# Patient Record
Sex: MALE | Race: WHITE | NOT HISPANIC OR LATINO | Employment: FULL TIME | URBAN - METROPOLITAN AREA
[De-identification: names, ages, dates, MRNs, and addresses within clinical notes are randomized per-mention and may not be internally consistent; named-entity substitution may affect disease eponyms.]

---

## 2017-10-06 ENCOUNTER — HOSPITAL ENCOUNTER (EMERGENCY)
Facility: CLINIC | Age: 32
Discharge: HOME | End: 2017-10-06
Attending: FAMILY MEDICINE | Admitting: FAMILY MEDICINE

## 2017-10-06 VITALS
TEMPERATURE: 98.2 F | HEART RATE: 92 BPM | BODY MASS INDEX: 24.41 KG/M2 | OXYGEN SATURATION: 99 % | RESPIRATION RATE: 16 BRPM | DIASTOLIC BLOOD PRESSURE: 81 MMHG | WEIGHT: 175 LBS | SYSTOLIC BLOOD PRESSURE: 137 MMHG
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
## 2017-10-06 DIAGNOSIS — Z23 NEED FOR VACCINATION: ICD-10-CM

## 2017-10-06 DIAGNOSIS — S61.412A LACERATION OF LEFT HAND WITHOUT FOREIGN BODY, INITIAL ENCOUNTER: ICD-10-CM

## 2017-10-06 PROCEDURE — 25000128 H RX IP 250 OP 636: Performed by: FAMILY MEDICINE

## 2017-10-06 PROCEDURE — 12041 INTMD RPR N-HF/GENIT 2.5CM/<: CPT

## 2017-10-06 PROCEDURE — 25000125 ZZHC RX 250: Performed by: FAMILY MEDICINE

## 2017-10-06 PROCEDURE — 99282 EMERGENCY DEPT VISIT SF MDM: CPT | Mod: 25 | Performed by: FAMILY MEDICINE

## 2017-10-06 PROCEDURE — 99283 EMERGENCY DEPT VISIT LOW MDM: CPT | Mod: 25 | Performed by: FAMILY MEDICINE

## 2017-10-06 PROCEDURE — 90471 IMMUNIZATION ADMIN: CPT | Performed by: FAMILY MEDICINE

## 2017-10-06 PROCEDURE — 12031 INTMD RPR S/A/T/EXT 2.5 CM/<: CPT | Mod: Z6 | Performed by: FAMILY MEDICINE

## 2017-10-06 PROCEDURE — 90471 IMMUNIZATION ADMIN: CPT

## 2017-10-06 PROCEDURE — 12041 INTMD RPR N-HF/GENIT 2.5CM/<: CPT | Performed by: FAMILY MEDICINE

## 2017-10-06 PROCEDURE — 90715 TDAP VACCINE 7 YRS/> IM: CPT | Performed by: FAMILY MEDICINE

## 2017-10-06 RX ORDER — LIDOCAINE HYDROCHLORIDE AND EPINEPHRINE 10; 10 MG/ML; UG/ML
2 INJECTION, SOLUTION INFILTRATION; PERINEURAL ONCE
Status: COMPLETED | OUTPATIENT
Start: 2017-10-06 | End: 2017-10-06

## 2017-10-06 RX ADMIN — LIDOCAINE HYDROCHLORIDE,EPINEPHRINE BITARTRATE 20 ML: 10; .01 INJECTION, SOLUTION INFILTRATION; PERINEURAL at 22:32

## 2017-10-06 RX ADMIN — CLOSTRIDIUM TETANI TOXOID ANTIGEN (FORMALDEHYDE INACTIVATED), CORYNEBACTERIUM DIPHTHERIAE TOXOID ANTIGEN (FORMALDEHYDE INACTIVATED), BORDETELLA PERTUSSIS TOXOID ANTIGEN (GLUTARALDEHYDE INACTIVATED), BORDETELLA PERTUSSIS FILAMENTOUS HEMAGGLUTININ ANTIGEN (FORMALDEHYDE INACTIVATED), BORDETELLA PERTUSSIS PERTACTIN ANTIGEN, AND BORDETELLA PERTUSSIS FIMBRIAE 2/3 ANTIGEN 0.5 ML: 5; 2; 2.5; 5; 3; 5 INJECTION, SUSPENSION INTRAMUSCULAR at 22:09

## 2017-10-06 ASSESSMENT — ENCOUNTER SYMPTOMS: WOUND: 1

## 2017-10-06 NOTE — ED AVS SNAPSHOT
[facility] Emergency Department    [address]     [place] [address]    Phone:  [phone]    Fax:  [phone]                                       Jose Manuel Avalos   MRN: [id number]    Department:  [facility] Emergency Department   Date of Visit:  10/6/2017           Patient Information     Date Of Birth          1985        Your diagnoses for this visit were:     Laceration of left hand without foreign body, initial encounter 2cm flap       You were seen by Valdemar Palacios DO.      Follow-up Information     Follow up with [facility] Emergency Department.    Specialty:  EMERGENCY MEDICINE    Why:  If symptoms worsen    Contact information:    [address] [facility]   [place] [address] [phone]    Additional information:    From [address]: Exit at [address] on south side of [place]. Turn right on [address]. Turn left at stoplight on [address]. [facility] will be in view two blocks ahead        Discharge Instructions       Please read and follow the handout(s) instructions. Return, if needed, for increased or worsening symptoms and as directed by the handout(s).    See the note for work.    Remove the stitches in about 10 days.    Electronically signed, Valdemar Palacios DO      Discharge References/Attachments     LACERATION, HAND: ALL CLOSURES (ENGLISH)      24 Hour Appointment Hotline       To make an appointment at any [facility] clinic, call [phone] ([phone]). If you don't have a family doctor or clinic, we will help you find one. [facility] clinics are conveniently located to serve the needs of you and your family.             Review of your medicines      Notice     You have not been prescribed any medications.            Procedures and tests performed during your visit     Laceration repair      Orders Needing Specimen Collection     None      Pending Results     No orders found from 10/4/2017 to 10/7/2017.     "        Pending Culture Results     No orders found from 10/4/2017 to 10/7/2017.            Pending Results Instructions     If you had any lab results that were not finalized at the time of your Discharge, you can call the ED Lab Result RN at [phone]. You will be contacted by this team for any positive Lab results or changes in treatment. The nurses are available 7 days a week from 10A to 6:30P.  You can leave a message 24 hours per day and they will return your call.        Thank you for choosing [facility]       Thank you for choosing [facility] for your care. Our goal is always to provide you with excellent care. Hearing back from our patients is one way we can continue to improve our services. Please take a few minutes to complete the written survey that you may receive in the mail after you visit with us. Thank you!        [employer] Information     [employer] lets you send messages to your doctor, view your test results, renew your prescriptions, schedule appointments and more. To sign up, go to [email or web address] . Click on \"Log in\" on the left side of the screen, which will take you to the Welcome page. Then click on \"Sign up Now\" on the right side of the page.     You will be asked to enter the access code listed below, as well as some personal information. Please follow the directions to create your username and password.     Your access code is: [email or web address]  Expires: 2018 10:41 PM     Your access code will  in 90 days. If you need help or a new code, please call your [facility] clinic or [phone].        Care EveryWhere ID     This is your Care EveryWhere ID. This could be used by other organizations to access your [facility] medical records  [id number]        Equal Access to Services     MINESH GUTIÉRREZ : kody John, sebastian flores. So [facility] [phone].    ATENCIÓN: Si joão sterling " disposición servicios gratuitos de asistencia lingüística. [place] [phone].    We comply with applicable federal civil rights laws and [place] laws. We do not discriminate on the basis of race, color, national origin, age, disability, sex, sexual orientation, or gender identity.            After Visit Summary       This is your record. Keep this with you and show to your community pharmacist(s) and doctor(s) at your next visit.

## 2017-10-06 NOTE — LETTER
1985      To Whom it may concern:    Jose Manuel Avalos was seen in our Emergency Department today, 10/06/17 for an injury that was reported to be work related.      The injury occurred on 10/6/2017  .  Diagnosis:   1. Laceration of left hand without foreign body, initial encounter        After returning the following restrictions apply for the next 10 days:    The employee must keep the injured site clean and dry.    Follow up: As needed for sign of infection.    Sincerely,    Valdemar Palacios  Physician  [facility] Emergency Room

## 2017-10-06 NOTE — ED AVS SNAPSHOT
[facility] Emergency Department    [address] [place] [address]    Phone:  [phone]    Fax:  [phone]                                       Jose Manuel Avalos   MRN: [id number]    Department:  [facility] Emergency Department   Date of Visit:  10/6/2017           After Visit Summary Signature Page     I have received my discharge instructions, and my questions have been answered. I have discussed any challenges I see with this plan with the nurse or doctor.    ..........................................................................................................................................  Patient/Patient Representative Signature      ..........................................................................................................................................  Patient Representative Print Name and Relationship to Patient    ..................................................               ................................................  Date                                            Time    ..........................................................................................................................................  Reviewed by Signature/Title    ...................................................              ..............................................  Date                                                            Time

## 2017-10-07 NOTE — DISCHARGE INSTRUCTIONS
Please read and follow the handout(s) instructions. Return, if needed, for increased or worsening symptoms and as directed by the handout(s).    See the note for work.    Remove the stitches in about 10 days.    Electronically signed, Valdemar Palacios DO

## 2017-10-07 NOTE — ED PROVIDER NOTES
History     Chief Complaint   Patient presents with     Laceration     HPI  Jose Manuel Avalos is a 32 year old male who presents to the ER with concerns about a laceration to the left hand. He states the injury occurred at work while he was taking down a sign cutting the palm of his hand on a bolt edge. The injury occurred about 13:00hrs today. He states he cleaned the hand well after and put some antibiotic ointment on it. No additional bleeding but wonders if it needs repair. He thinks his last tetanus was about 10 years ago.    I reviewed the following nurse triage note:     Status: Signed : Marizol Ryan, RN (Registered Nurse)        Pt presents with a laceration to the palm of his left hand. He reports that he was taking down a sign at work and was cut by a bolt on the back of it.  Bleeding is controlled, last Td was 10 years ago                  I have reviewed the Medications, Allergies, Past Medical and Surgical History, and Social History in the Epic system.      History reviewed. No pertinent past medical history.     Past Surgical History:   Procedure Laterality Date     DENTAL SURGERY         No family history on file.    Social History     Social History     Marital status: Single     Spouse name: N/A     Number of children: N/A     Years of education: N/A     Occupational History     Not on file.     Social History Main Topics     Smoking status: Current Every Day Smoker     Packs/day: 0.50     Smokeless tobacco: Never Used     Alcohol use No     Drug use: No     Sexual activity: Not on file     Other Topics Concern     Not on file     Social History Narrative       No current outpatient prescriptions on file.       No Known Allergies      There is no immunization history on file for this patient.    [facility] [place] Immunization Information Connection   0 RECORD(S) FOUND          Review of Systems   Skin: Positive for wound (left hand).   All other systems reviewed and are  negative.      Physical Exam   BP: 137/81  Pulse: 92  Temp: 98.2  F (36.8  C)  Resp: 16  Weight: 79.4 kg (175 lb)  SpO2: 99 %  Physical Exam   Constitutional: He appears well-developed and well-nourished. No distress.   Musculoskeletal:        Left hand: He exhibits tenderness and laceration. He exhibits normal range of motion and normal two-point discrimination. Normal sensation noted. Normal strength noted.        Hands:  Nursing note and vitals reviewed.          ED Course     ED Course     Laceration repair  Date/Time: 10/6/2017 10:35 PM  Performed by: AMNA ENRIQUEZ  Authorized by: AMNA ENRIQUEZ   Consent: Verbal consent obtained.  Risks and benefits: risks, benefits and alternatives were discussed  Consent given by: patient  Patient identity confirmed: verbally with patient  Body area: upper extremity  Location details: left hand  Laceration length: 2 (flap) cm  Tendon involvement: none  Nerve involvement: none  Anesthesia: local infiltration    Anesthesia:  Local Anesthetic: lidocaine 1% with epinephrine  Anesthetic total: 2 mL    Sedation:  Patient sedated: no  Preparation: Patient was prepped and draped in the usual sterile fashion.  Irrigation solution: tap water  Irrigation method: tap and syringe  Amount of cleaning: extensive  Debridement: none  Degree of undermining: none  Skin closure: 4-0 nylon  Number of sutures: 5  Technique: simple  Approximation: close  Approximation difficulty: simple  Patient tolerance: Patient tolerated the procedure well with no immediate complications                   Critical Care time:  none            Medications ordered to be administered in the ER:    Medications   Tdap (tetanus-diphtheria-acell pertussis) (ADACEL) injection 0.5 mL (0.5 mLs Intramuscular Given 10/6/17 2209)   lidocaine 1% with EPINEPHrine 1:100,000 injection 2 mL (20 mLs Intradermal Given 10/6/17 2232)     Assessments & Plan (with Medical Decision Making)  32-year-old male to the ER  with concerns of a laceration to the palm of the left hand.  Patient was not up-to-date on his tetanus immunization and this was given to him.  Flap laceration noted to the palm of the left hand without tendon or nerve involvement.  The area was cleansed and irrigated and repaired as noted above.  Patient was instructed in appropriate care of wound and a work note was filled out for him to give to his employer.  He is instructed to have the stitches removed in approximately 10 days.       I have reviewed the nursing notes.    I have reviewed the findings, diagnosis, plan and need for follow up with the patient.           I verbally discussed the findings of the evaluation today in the ER. I have verbally discussed with Jose Manuel the suggested treatment(s) as described in the discharge instructions and handouts. I have prescribed the above listed medications and instructed him on appropriate use of these medications.      I have verbally suggested he follow-up in his clinic or return to the ER for increased symptoms. See the follow-up recommendations documented  in the after visit summary in this visit's EPIC chart.      Final diagnoses:   Laceration of left hand without foreign body, initial encounter - 2cm flap       10/6/2017   [facility] EMERGENCY DEPARTMENT     Valdemar Palacios,   10/06/17 0229

## 2017-10-07 NOTE — ED NOTES
Pt presents with a laceration to the palm of his left hand. He reports that he was taking down a sign at work and was cut by a bolt on the back of it.  Bleeding is controlled, last Td was 10 years ago

## 2019-10-03 NOTE — PROGRESS NOTES
Subjective     Jose Manuel Avalos is a 34 year old male who presents to clinic today for the following health issues:    HPI   Concern - Neck irritation   Onset: 2-3 months     Description:   Shoulder to shoulder across the back of the neck is a burning, itching, tingling without rash.  Thought originally it was due to detergent but that is not the case.  The more active he is the less he notices it.    Therapies Tried and outcome: none    Patient states symptoms started about 2-6 mos ago he was having posterior neck sensation of prickly needles, hot, stabbing pain that comes and goes in severity. He thought it was originally from a bug bite but that was cleared a long time ago.     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 He is often lifting overhead often and looking upwards.   Also recently noted a small nodule mildly tender posterior neck on right 1 cm firm mobile.       There is no problem list on file for this patient.    Past Surgical History:   Procedure Laterality Date     DENTAL SURGERY         Social History     Tobacco Use     Smoking status: Current Every Day Smoker     Packs/day: 1.00     Smokeless tobacco: Never Used   Substance Use Topics     Alcohol use: Yes     History reviewed. No pertinent family history.      No current outpatient medications on file.     No Known Allergies  Recent Labs   Lab Test 06/26/13  0345 06/26/13  0050   ALT  --  20   CR 1.56* 1.92*   GFRESTIMATED 55* 43*   GFRESTBLACK 66 52*   POTASSIUM 4.5 4.3      BP Readings from Last 3 Encounters:   10/08/19 122/70   10/06/17 137/81   06/26/13 102/56    Wt Readings from Last 3 Encounters:   10/08/19 86.6 kg (191 lb)   10/06/17 79.4 kg (175 lb)   06/26/13 73.9 kg (163 lb)        Reviewed and updated as needed this visit by Provider    Review of Systems   ROS COMP: Constitutional, HEENT, cardiovascular, pulmonary, GI, , musculoskeletal, neuro, skin, endocrine  and psych systems are negative, except as otherwise noted.      Objective    /70   Pulse 80   Temp 98.8  F (37.1  C) (Temporal)   Resp 16   Wt 86.6 kg (191 lb)   SpO2 98%   BMI 26.64 kg/m    Body mass index is 26.64 kg/m .  Physical Exam   GENERAL: healthy, alert and no distress  EYES: Eyes grossly normal to inspection, PERRL and conjunctivae and sclerae normal  HENT: ear canals and TM's normal, nose and mouth without ulcers or lesions  NECK: no adenopathy, no asymmetry, masses, or scars and thyroid normal to palpation  RESP: lungs clear to auscultation - no rales, rhonchi or wheezes  CV: regular rate and rhythm, normal S1 S2, no S3 or S4, no murmur, click or rub, no peripheral edema and peripheral pulses strong  MS: Cervical Spine Exam: Inspection: normal cervical lordosis, no scapular winging  Tender:  None  Range of Motion:  Full ROM of cervical spine, flexion:  painful, left lateral bending: painful, right lateral bending: painful  Strength: Full strength of all neck muscles  Special tests:  Spurling's - negative - left, Spurling's - negative - right  SKIN: small nodule posterior neck as noted above      Assessment & Plan     1. Cervical radiculopathy at C5    - XR Cervical Spine 2/3 Views; Future  - PHYSICAL THERAPY REFERRAL; Future    2. Nodule of skin of neck    - US Head Neck Soft Tissue; Future     Concern for cervicalgia due to history of work and amount of overhead lifting and looking up repetitive. Will have us of nodule possible lymph vs seb cyst.   Restriction letter given for work and PT to see if this helps symptoms if not would recommend MRI     Tobacco Cessation:   reports that he has been smoking. He has been smoking about 1.00 pack per day. He has never used smokeless tobacco.  Tobacco Cessation Action Plan: Information offered: Patient not interested at this time        Patient Instructions   Recommend avoiding overhead lifting, prolonged flexion of neck in upwards motion (looking  up). No heavy pushing or pulling. May lift with legs.   I will call you with radiology read of xray and ultra sound.     Recommend physical therapy for further treatment plan for cervical nerve path symptoms     Return to clinic in 6 weeks if symptoms not improving            DAKSHA Laird [facility]

## 2019-10-08 ENCOUNTER — ANCILLARY PROCEDURE (OUTPATIENT)
Dept: GENERAL RADIOLOGY | Facility: OTHER | Age: 34
End: 2019-10-08
Attending: NURSE PRACTITIONER
Payer: COMMERCIAL

## 2019-10-08 ENCOUNTER — OFFICE VISIT (OUTPATIENT)
Dept: FAMILY MEDICINE | Facility: OTHER | Age: 34
End: 2019-10-08
Payer: COMMERCIAL

## 2019-10-08 VITALS
WEIGHT: 191 LBS | TEMPERATURE: 98.8 F | OXYGEN SATURATION: 98 % | DIASTOLIC BLOOD PRESSURE: 70 MMHG | RESPIRATION RATE: 16 BRPM | HEART RATE: 80 BPM | BODY MASS INDEX: 26.64 KG/M2 | SYSTOLIC BLOOD PRESSURE: 122 MMHG

## 2019-10-08 DIAGNOSIS — M54.12 CERVICAL RADICULOPATHY AT C5: Primary | ICD-10-CM

## 2019-10-08 DIAGNOSIS — R22.1 NODULE OF SKIN OF NECK: ICD-10-CM

## 2019-10-08 DIAGNOSIS — M54.12 CERVICAL RADICULOPATHY AT C5: ICD-10-CM

## 2019-10-08 PROCEDURE — 72040 X-RAY EXAM NECK SPINE 2-3 VW: CPT | Mod: FY

## 2019-10-08 PROCEDURE — 99203 OFFICE O/P NEW LOW 30 MIN: CPT | Performed by: NURSE PRACTITIONER

## 2019-10-08 NOTE — PATIENT INSTRUCTIONS
Recommend avoiding overhead lifting, prolonged flexion of neck in upwards motion (looking up). No heavy pushing or pulling. May lift with legs.   I will call you with radiology read of xray and ultra sound.     Recommend physical therapy for further treatment plan for cervical nerve path symptoms     Return to clinic in 6 weeks if symptoms not improving

## 2019-10-08 NOTE — LETTER
[facility]  [address]  [place] [address]  Phone: [phone]    October 8, 2019        Jose Manuel Avalos  [address]  [facility] [address]          To whom it may concern:    RE: Jose Manuel Avalos    Patient may return to work with the following: Recommend avoiding overhead lifting, prolonged flexion of neck in upwards motion (looking up). No heavy pushing or pulling. May lift with legs. Will be seeing physical therapy for treatment of cervical spine radicular symptoms.     Return to clinic in 6 weeks for follow up symptoms.     Please contact me for questions or concerns.      Sincerely,        DAKSHA Laird CNP

## 2019-10-09 ENCOUNTER — TELEPHONE (OUTPATIENT)
Dept: FAMILY MEDICINE | Facility: OTHER | Age: 34
End: 2019-10-09

## 2019-10-09 DIAGNOSIS — M54.12 CERVICAL RADICULOPATHY AT C5: Primary | ICD-10-CM

## 2019-10-09 NOTE — TELEPHONE ENCOUNTER
Emmanuelle Tsai RT (R) contacted Jose Manuel on 10/09/19 and left a message. If patient calls back please inform patient of results below, thanks     Please advise Jose Manuel Avalos,  1985, that his xray results were abnormal cervical curvature at area of symptoms recommend he have MRI completed per radiology read. Please let me know if he would like order placed for this.   [phone] (home)   Thank you   Amy Mills CNP

## 2019-10-09 NOTE — RESULT ENCOUNTER NOTE
Please advise Jose Manuel Avalos,  1985, that his xray results were abnormal cervical curvature at area of symptoms recommend he have MRI completed per radiology read. Please let me know if he would like order placed for this.   [phone] (home)   Thank you  Amy Mills CNP

## 2019-10-10 NOTE — TELEPHONE ENCOUNTER
Patient calling back, results were relayed. Patient states he would like to go ahead and get the MRI. Patient would like MRI done in [place]. Please advise.

## 2019-10-11 NOTE — TELEPHONE ENCOUNTER
Spoke with patient informed him of message below phone number to [facility] was given to patient to schedule   Closing encounter  Emmanuelle Tsai RT (R)

## 2019-10-15 ENCOUNTER — HOSPITAL ENCOUNTER (OUTPATIENT)
Dept: ULTRASOUND IMAGING | Facility: CLINIC | Age: 34
Discharge: HOME | End: 2019-10-15
Attending: NURSE PRACTITIONER | Admitting: NURSE PRACTITIONER
Payer: COMMERCIAL

## 2019-10-15 ENCOUNTER — HOSPITAL ENCOUNTER (OUTPATIENT)
Dept: MRI IMAGING | Facility: CLINIC | Age: 34
End: 2019-10-15
Attending: NURSE PRACTITIONER
Payer: COMMERCIAL

## 2019-10-15 ENCOUNTER — HOSPITAL ENCOUNTER (EMERGENCY)
Facility: CLINIC | Age: 34
End: 2019-10-15
Payer: COMMERCIAL

## 2019-10-15 DIAGNOSIS — M54.12 CERVICAL RADICULOPATHY AT C5: ICD-10-CM

## 2019-10-15 DIAGNOSIS — R22.1 NODULE OF SKIN OF NECK: ICD-10-CM

## 2019-10-15 PROCEDURE — 72141 MRI NECK SPINE W/O DYE: CPT

## 2019-10-15 PROCEDURE — 76536 US EXAM OF HEAD AND NECK: CPT

## 2019-10-15 NOTE — RESULT ENCOUNTER NOTE
Please advise Jose Manuel Avalos,  1985, that his us results were benign appearing lymph node  [phone] (home)   Thank you  Amy Mills CNP

## 2019-10-16 DIAGNOSIS — M54.12 CERVICAL RADICULOPATHY AT C5: Primary | ICD-10-CM

## 2019-10-16 DIAGNOSIS — R93.7 ABNORMAL CT SCAN, CERVICAL SPINE: ICD-10-CM

## 2019-10-21 ENCOUNTER — HOSPITAL ENCOUNTER (OUTPATIENT)
Dept: PHYSICAL THERAPY | Facility: OTHER | Age: 34
Setting detail: THERAPIES SERIES
End: 2019-10-21
Attending: NURSE PRACTITIONER
Payer: COMMERCIAL

## 2019-10-21 DIAGNOSIS — M54.12 CERVICAL RADICULOPATHY AT C5: ICD-10-CM

## 2019-10-21 PROCEDURE — 97110 THERAPEUTIC EXERCISES: CPT | Mod: GP | Performed by: PHYSICAL THERAPIST

## 2019-10-21 PROCEDURE — 97161 PT EVAL LOW COMPLEX 20 MIN: CPT | Mod: GP | Performed by: PHYSICAL THERAPIST

## 2019-10-21 PROCEDURE — 97140 MANUAL THERAPY 1/> REGIONS: CPT | Mod: GP | Performed by: PHYSICAL THERAPIST

## 2019-10-21 NOTE — PROGRESS NOTES
10/21/19 1300   General Information   Type of Visit Initial OP Ortho PT Evaluation   Start of Care Date 10/21/19   Referring Physician Amy Boudreaux CNP   Patient/Family Goals Statement To be able to work with no restrictions and no pain   Orders Evaluate and Treat   Date of Order 10/08/19   Certification Required? No   Medical Diagnosis Cervical raidiculopathy at C5   Surgical/Medical history reviewed Yes   Precautions/Limitations no known precautions/limitations   Body Part(s)   Body Part(s) Cervical Spine   Presentation and Etiology   Pertinent history of current problem (include personal factors and/or comorbidities that impact the POC) Patient symptoms started in the Spring, came on over time. Pain is on the left, no pain on the right. Pain today is a #3. 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 Been working still with the restrictions no lifting heavy, no looking up for periods of time, use legs mostly. Not lifting as heavy things. Pain is worse with moving around and working. The pain sometimes causes him to feel like he needs to look down and the head is a little too heavy. PMHX: none, and no HX of neck pain. Looking up alot to install the signs. Looking up can be from 30 min to 2 hours. Patient is right handed. 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    Impairments A. Pain;C. Swelling;E. Decreased flexibility;L. Tingling   Functional Limitations perform activities of daily living;perform required work activities;perform desired leisure / sports activities   Symptom Location left side of the neck and the upper shoulders   How/Where did it occur From insidious onset   Onset date of current episode/exacerbation 05/21/19   Chronicity New   Pain/symptoms exacerbated by C. Lifting;D. Carrying;G. Certain positions;H. Overhead reach;J. ADL;K. Home tasks;L. Work tasks   Pain/symptoms eased by C. Rest    Current / Previous Interventions   Diagnostic Tests:   (see MRI and xray)   Prior Level of Function   Prior Level of Function-Mobility independent   Current Level of Function   Patient role/employment history A. Employed   [facility]   Fall Risk Screen   Fall screen completed by PT   Have you fallen 2 or more times in the past year? No   Have you fallen and had an injury in the past year? No   Is patient a fall risk? No   Cervical Spine   Observation forward head, somewhat straight spine   Posture forward head   Cervical Flexion ROM WNL   Cervical Extension ROM WNL   Cervical Right Side Bending ROM WNL   Cervical Left Side Bending ROM WNL   Cervical Right Rotation ROM WNL   Cervical Left Rotation ROM WNL   Shoulder AROM Screen WNL   Cervical/Thoracic/Shoulder ROM Comments neck retractions 50%   Shoulder/Wrist/Hand Strength Comments WNL   Upper Trapezius Flexibility tightness B   Cervical Flexibility Comments tightness into retractions   Cervical/Shoulder Special Tests Comments PROM tests and retraction limited did not cause pain with repeated retractions   Palpation tightness and MM spasm left mid cervical   Planned Therapy Interventions   Planned Therapy Interventions joint mobilization;manual therapy;neuromuscular re-education;ROM;strengthening;stretching   Planned Modality Interventions   Planned Modality Interventions Electrical stimulation   Clinical Impression   Criteria for Skilled Therapeutic Interventions Met yes, treatment indicated   PT Diagnosis neck pain, upper shoulder pain, decrease neck retraction   Influenced by the following impairments pain   Functional limitations due to impairments pain with all movements and with more activity more pain   Clinical Presentation Evolving/Changing   Clinical Presentation Rationale NDI 16   Clinical Decision Making (Complexity) Low complexity   Therapy Frequency   (1-2x per wk, will see how the first wk goes)   Predicted Duration of Therapy  Intervention (days/wks) 10 wks   Risk & Benefits of therapy have been explained Yes   Patient, Family & other staff in agreement with plan of care Yes   Clinical Impression Comments Patient has mm spasm and tightness on the left at the level of C5, C5 is rotated to the right and is painful at the facet joint. Upper traps compensating causing pain also.    Education Assessment   Preferred Learning Style Listening;Reading;Demonstration   Barriers to Learning No barriers   ORTHO GOALS   PT Ortho Eval Goals 1;2   Ortho Goal 1   Goal Identifier 1   Goal Description Patient has no pain and has full retractions of the neck and is able to return to full duty at work   Target Date 12/30/19   Ortho Goal 2   Goal Identifier 2   Goal Description Patient has full painfree function as seen by an NDI score of 0-5.    Target Date 12/30/19   Total Evaluation Time   PT Eval, Low Complexity Minutes (04170) 15   Thank you for the referral,            Olive Davila PT

## 2019-10-25 ENCOUNTER — OFFICE VISIT (OUTPATIENT)
Dept: NEUROSURGERY | Facility: CLINIC | Age: 34
End: 2019-10-25
Payer: COMMERCIAL

## 2019-10-25 VITALS
SYSTOLIC BLOOD PRESSURE: 132 MMHG | DIASTOLIC BLOOD PRESSURE: 70 MMHG | HEART RATE: 65 BPM | RESPIRATION RATE: 18 BRPM | WEIGHT: 192.8 LBS | TEMPERATURE: 98.3 F | BODY MASS INDEX: 26.89 KG/M2 | OXYGEN SATURATION: 97 %

## 2019-10-25 DIAGNOSIS — M54.2 CERVICALGIA: Primary | ICD-10-CM

## 2019-10-25 PROCEDURE — 99203 OFFICE O/P NEW LOW 30 MIN: CPT | Performed by: PHYSICIAN ASSISTANT

## 2019-10-25 ASSESSMENT — PAIN SCALES - GENERAL: PAINLEVEL: MILD PAIN (2)

## 2019-10-25 NOTE — NURSING NOTE
"Jose Manuel Avalos is a 34 year old male who presents for:  Chief Complaint   Patient presents with     Consult     neck pain        Initial Vitals:  /70   Pulse 65   Temp 98.3  F (36.8  C) (Temporal)   Resp 18   Wt 192 lb 12.8 oz (87.5 kg)   SpO2 97%   BMI 26.89 kg/m   Estimated body mass index is 26.89 kg/m  as calculated from the following:    Height as of 6/26/13: 5' 11\" (1.803 m).    Weight as of this encounter: 192 lb 12.8 oz (87.5 kg).. Body surface area is 2.09 meters squared. BP completed using cuff size: regular  Mild Pain (2)        Nursing Comments:         Jolly Perez    "

## 2019-10-25 NOTE — LETTER
10/25/2019         RE: Jose Manuel Avalos  [address]  [place] [address]        Dear Colleague,    Thank you for referring your patient, Jose Manuel Avalos, to the [facility]. Please see a copy of my visit note below.    Dr. Remington Pan  [facility]  Neurosurgery Clinic Visit      CC: neck pain    Primary care Provider: No Ref-Primary, Physician    Referring Provider:  Amy Mills      Reason For Visit:   I was asked to consult on the patient for neck pain.      HPI: Jose Manuel Avalos is a 34 year old male who presents for evaluation of his chief complaint of neck pain.  He has had symptoms of gradually worsening neck pain over the last 3 weeks, with no significant event or injury. No radiating arm pain or paresthesias. He has been working with a chiropractor, with good symptomatic improvement.  No bowel or bladder changes, no problems with balance or coordination.      Past Medical History reviewed with patient during visit.    Past Surgical History:   Procedure Laterality Date     DENTAL SURGERY       Past Surgical History reviewed with patient during visit.    No current outpatient medications on file.     No current facility-administered medications for this visit.        No Known Allergies    Social History     Socioeconomic History     Marital status: Single     Spouse name: None     Number of children: None     Years of education: None     Highest education level: None   Occupational History     None   Social Needs     Financial resource strain: None     Food insecurity:     Worry: None     Inability: None     Transportation needs:     Medical: None     Non-medical: None   Tobacco Use     Smoking status: Current Every Day Smoker     Packs/day: 1.00     Smokeless tobacco: Never Used   Substance and Sexual Activity     Alcohol use: Yes     Drug use: No     Sexual activity: Yes     Partners: Female     Birth control/protection: None   Lifestyle     Physical  activity:     Days per week: None     Minutes per session: None     Stress: None   Relationships     Social connections:     Talks on phone: None     Gets together: None     Attends [employer] service: None     Active member of club or organization: None     Attends meetings of clubs or organizations: None     Relationship status: None     Intimate partner violence:     Fear of current or ex partner: None     Emotionally abused: None     Physically abused: None     Forced sexual activity: None   Other Topics Concern     None   Social History Narrative     None       No family history on file.       ROS: 10 point ROS neg other than the symptoms noted above in the HPI.    Vital Signs: /70   Pulse 65   Temp 98.3  F (36.8  C) (Temporal)   Resp 18   Wt 87.5 kg (192 lb 12.8 oz)   SpO2 97%   BMI 26.89 kg/m       Examination:  Constitutional:  Alert, well nourished, NAD.  HEENT: Normocephalic, atraumatic.   Pulmonary:  Without shortness of breath, normal effort.   Lymph: no lymphadenopathy to low back or LE.   Integumentary: Skin is free of rashes or lesions.   Cardiovascular:  No pitting edema of BLE.    Psych: Normal affect, no apparent distress    Neurological:  Awake  Alert  Oriented x 3  Speech clear  Cranial nerves II - XII grossly intact  Motor exam   Shoulder Abduction:  Right:  5/5   Left:  5/5  Biceps:                      Right:  5/5   Left:  5/5  Triceps:                     Right:  5/5   Left:  5/5  Wrist Extensors:       Right:  5/5   Left:  5/5  Wrist Flexors:           Right:  5/5   Left:  5/5  Intrinsics:                   Right:  5/5   Left:  5/5  Sensation normal to bilateral upper and lower extremities.    Reflexes are 2+ in the patellar and Achilles. There is no clonus. Downgoing Babinski.    Musculoskeletal:  Gait: Able to stand from a seated position. Normal non-antalgic, non-myelopathic gait.  Able to heel/toe walk without loss of balance  Cervical examination reveals good range of motion.   No tenderness to palpation of the cervical spine or paraspinous muscles bilaterally.    Imaging:   MRI of the cervical spine was reviewed in the office today.  It shows minimal disc degeneration throughout the cervical spine.  There is a gradual loss of cervical lordosis.  No nerve compression or disc herniations.    Assessment/Plan:     dat Avalos is a 34 year old male.  I did have a discussion with the patient regarding his symptoms.  He has had several weeks of gradually worsening neck pain, with no event or injury.  He has an MRI that shows a loss of lordosis, with mild disc degeneration throughout.  There is no significant disc herniation or nerve impingement.  He does not have any arm pain or paresthesias.  No focal neuro deficits.  He is scheduled to begin physical therapy, and I did recommend that he pursue this.  He can also follow-up with pain management if he wants, they can possibly consider some trigger point injections which may be helpful.  We can see him back on an as-needed basis.  He voiced agreement and understanding.          Juan Crawford PA-C  [facility]  [address] [address]    Tel [phone]  Pager [phone]      Again, thank you for allowing me to participate in the care of your patient.        Sincerely,        Juan Crawford PA-C

## 2019-10-25 NOTE — PROGRESS NOTES
Dr. Remington Pan  [facility]  Neurosurgery Clinic Visit      CC: neck pain    Primary care Provider: No Ref-Primary, Physician    Referring Provider:  Amy Mills      Reason For Visit:   I was asked to consult on the patient for neck pain.      HPI: Jose Manuel Avalos is a 34 year old male who presents for evaluation of his chief complaint of neck pain.  He has had symptoms of gradually worsening neck pain over the last 3 weeks, with no significant event or injury. No radiating arm pain or paresthesias. He has been working with a chiropractor, with good symptomatic improvement.  No bowel or bladder changes, no problems with balance or coordination.      Past Medical History reviewed with patient during visit.    Past Surgical History:   Procedure Laterality Date     DENTAL SURGERY       Past Surgical History reviewed with patient during visit.    No current outpatient medications on file.     No current facility-administered medications for this visit.        No Known Allergies    Social History     Socioeconomic History     Marital status: Single     Spouse name: None     Number of children: None     Years of education: None     Highest education level: None   Occupational History     None   Social Needs     Financial resource strain: None     Food insecurity:     Worry: None     Inability: None     Transportation needs:     Medical: None     Non-medical: None   Tobacco Use     Smoking status: Current Every Day Smoker     Packs/day: 1.00     Smokeless tobacco: Never Used   Substance and Sexual Activity     Alcohol use: Yes     Drug use: No     Sexual activity: Yes     Partners: Female     Birth control/protection: None   Lifestyle     Physical activity:     Days per week: None     Minutes per session: None     Stress: None   Relationships     Social connections:     Talks on phone: None     Gets together: None     Attends Islam service: None     Active member of club or organization: None      Attends meetings of clubs or organizations: None     Relationship status: None     Intimate partner violence:     Fear of current or ex partner: None     Emotionally abused: None     Physically abused: None     Forced sexual activity: None   Other Topics Concern     None   Social History Narrative     None       No family history on file.       ROS: 10 point ROS neg other than the symptoms noted above in the HPI.    Vital Signs: /70   Pulse 65   Temp 98.3  F (36.8  C) (Temporal)   Resp 18   Wt 87.5 kg (192 lb 12.8 oz)   SpO2 97%   BMI 26.89 kg/m      Examination:  Constitutional:  Alert, well nourished, NAD.  HEENT: Normocephalic, atraumatic.   Pulmonary:  Without shortness of breath, normal effort.   Lymph: no lymphadenopathy to low back or LE.   Integumentary: Skin is free of rashes or lesions.   Cardiovascular:  No pitting edema of BLE.    Psych: Normal affect, no apparent distress    Neurological:  Awake  Alert  Oriented x 3  Speech clear  Cranial nerves II - XII grossly intact  Motor exam   Shoulder Abduction:  Right:  5/5   Left:  5/5  Biceps:                      Right:  5/5   Left:  5/5  Triceps:                     Right:  5/5   Left:  5/5  Wrist Extensors:       Right:  5/5   Left:  5/5  Wrist Flexors:           Right:  5/5   Left:  5/5  Intrinsics:                   Right:  5/5   Left:  5/5  Sensation normal to bilateral upper and lower extremities.    Reflexes are 2+ in the patellar and Achilles. There is no clonus. Downgoing Babinski.    Musculoskeletal:  Gait: Able to stand from a seated position. Normal non-antalgic, non-myelopathic gait.  Able to heel/toe walk without loss of balance  Cervical examination reveals good range of motion.  No tenderness to palpation of the cervical spine or paraspinous muscles bilaterally.    Imaging:   MRI of the cervical spine was reviewed in the office today.  It shows minimal disc degeneration throughout the cervical spine.  There is a gradual loss of  cervical lordosis.  No nerve compression or disc herniations.    Assessment/Plan:     cervicalgia    Jose Manuel Avalos is a 34 year old male.  I did have a discussion with the patient regarding his symptoms.  He has had several weeks of gradually worsening neck pain, with no event or injury.  He has an MRI that shows a loss of lordosis, with mild disc degeneration throughout.  There is no significant disc herniation or nerve impingement.  He does not have any arm pain or paresthesias.  No focal neuro deficits.  He is scheduled to begin physical therapy, and I did recommend that he pursue this.  He can also follow-up with pain management if he wants, they can possibly consider some trigger point injections which may be helpful.  We can see him back on an as-needed basis.  He voiced agreement and understanding.          Juan Crawford PA-C  [facility]  [address] [address]    Tel [phone]  Pager [phone]

## 2019-10-31 ENCOUNTER — HOSPITAL ENCOUNTER (OUTPATIENT)
Dept: PHYSICAL THERAPY | Facility: OTHER | Age: 34
Setting detail: THERAPIES SERIES
End: 2019-10-31
Attending: NURSE PRACTITIONER
Payer: COMMERCIAL

## 2019-10-31 PROCEDURE — 97110 THERAPEUTIC EXERCISES: CPT | Mod: GP | Performed by: PHYSICAL THERAPIST

## 2019-10-31 PROCEDURE — 97140 MANUAL THERAPY 1/> REGIONS: CPT | Mod: GP | Performed by: PHYSICAL THERAPIST

## 2019-11-07 ENCOUNTER — HOSPITAL ENCOUNTER (OUTPATIENT)
Dept: PHYSICAL THERAPY | Facility: OTHER | Age: 34
Setting detail: THERAPIES SERIES
End: 2019-11-07
Attending: NURSE PRACTITIONER
Payer: COMMERCIAL

## 2019-11-07 PROCEDURE — 97110 THERAPEUTIC EXERCISES: CPT | Mod: GP | Performed by: PHYSICAL THERAPIST

## 2019-11-07 NOTE — PROGRESS NOTES
Outpatient Physical Therapy Discharge Note     Patient: Jose Manuel Avalos  : 1985    Beginning/End Dates of Reporting Period:  10/21/19 to 2019    Referring Provider: Amy Simpson Diagnosis: neck pain     Client Self Report: Pain #0, been a #0 since last seen.     Objective Measurements:  Objective Measure: NDI  Details: 2  Objective Measure: neck pain left  Details: #0 for 10 days.          Goals:  Goal Identifier 1   Goal Description Patient has no pain and has full retractions of the neck and is able to return to full duty at work   Target Date 19   Date Met  19   Progress:     Goal Identifier 2   Goal Description Patient has full painfree function as seen by an NDI score of 0-5.    Target Date 19   Date Met  19   Progress:       Progress Toward Goals:   Progress this reporting period: Doing well no pain and goals met, has completed HEP          Plan:  Discharge from therapy.    Discharge:    Reason for Discharge: Patient has met all goals.    Equipment Issued: theraband    Discharge Plan: Patient to continue home program.    Thank you for the referral,            Olive Davila PT

## 2020-06-18 ENCOUNTER — OFFICE VISIT (OUTPATIENT)
Dept: FAMILY MEDICINE | Facility: OTHER | Age: 35
End: 2020-06-18
Payer: COMMERCIAL

## 2020-06-18 VITALS
OXYGEN SATURATION: 99 % | HEART RATE: 91 BPM | RESPIRATION RATE: 18 BRPM | HEIGHT: 71 IN | DIASTOLIC BLOOD PRESSURE: 66 MMHG | TEMPERATURE: 97.9 F | BODY MASS INDEX: 25.83 KG/M2 | WEIGHT: 184.5 LBS | SYSTOLIC BLOOD PRESSURE: 104 MMHG

## 2020-06-18 DIAGNOSIS — G56.32 RADIAL NERVE COMPRESSION, LEFT: Primary | ICD-10-CM

## 2020-06-18 PROCEDURE — 99214 OFFICE O/P EST MOD 30 MIN: CPT | Performed by: FAMILY MEDICINE

## 2020-06-18 RX ORDER — NAPROXEN 500 MG/1
500 TABLET ORAL 2 TIMES DAILY WITH MEALS
Qty: 30 TABLET | Refills: 0 | Status: SHIPPED | OUTPATIENT
Start: 2020-06-18

## 2020-06-18 ASSESSMENT — MIFFLIN-ST. JEOR: SCORE: 1809.02

## 2020-06-18 NOTE — PROGRESS NOTES
"Subjective     Jose Manuel Avalos is a 34 year old male who presents to clinic today for the following health issues:    HPI     Concern - Pain from left elbow to hand  Onset: last week Monday    Description:   Doesn't recall an injury or overworking. Comes from the joint of the elbow to the wrist area. No pain or numbness in hand. Patient is a .     Therapies Tried and outcome: tylenol and ibuprofen, ice and heat. didn't help at all       Patient Active Problem List   Diagnosis     Cervical radiculopathy at C5     Nodule of skin of neck     Past Surgical History:   Procedure Laterality Date     DENTAL SURGERY         Social History     Tobacco Use     Smoking status: Current Every Day Smoker     Packs/day: 1.00     Smokeless tobacco: Never Used   Substance Use Topics     Alcohol use: Yes     History reviewed. No pertinent family history.      Current Outpatient Medications   Medication Sig Dispense Refill     naproxen (NAPROSYN) 500 MG tablet Take 1 tablet (500 mg) by mouth 2 times daily (with meals) 30 tablet 0     No Known Allergies  Recent Labs   Lab Test 06/26/13  0345 06/26/13  0050   ALT  --  20   CR 1.56* 1.92*   GFRESTIMATED 55* 43*   GFRESTBLACK 66 52*   POTASSIUM 4.5 4.3      BP Readings from Last 3 Encounters:   06/18/20 104/66   10/25/19 132/70   10/08/19 122/70    Wt Readings from Last 3 Encounters:   06/18/20 83.7 kg (184 lb 8 oz)   10/25/19 87.5 kg (192 lb 12.8 oz)   10/08/19 86.6 kg (191 lb)                    Reviewed and updated as needed this visit by Provider         Review of Systems   Constitutional, HEENT, cardiovascular, pulmonary, gi and gu systems are negative, except as otherwise noted.      Objective    /66   Pulse 91   Temp 97.9  F (36.6  C) (Oral)   Resp 18   Ht 1.803 m (5' 11\")   Wt 83.7 kg (184 lb 8 oz)   SpO2 99%   BMI 25.73 kg/m    Body mass index is 25.73 kg/m .  Physical Exam   GENERAL: healthy, alert and no distress  RESP: lungs clear to auscultation " - no rales, rhonchi or wheezes  CV: regular rate and rhythm, normal S1 S2, no S3 or S4, no murmur, click or rub, no peripheral edema and peripheral pulses strong  MS: normal muscle tone, normal range of motion, no cyanosis, clubbing, or edema and LUE exam shows normal strength and muscle mass, no deformities, no erythema, induration, or nodules, no evidence of joint effusion, ROM of all joints is normal, no evidence of joint instability and recreation of pain with compression between the lateral epicondyle and olecranon on the left side  SKIN: no suspicious lesions or rashes  NEURO: Normal strength and tone, mentation intact and speech normal  PSYCH: mentation appears normal, affect normal/bright    Diagnostic Test Results:  Labs reviewed in Epic        ASSESSMENT and PLAN  1. Radial nerve compression, left  34-year-old male with 1 week history of left elbow and forearm pain.  No trauma, no specific propagating factors.  On exam no obvious tenderness over the lateral epicondyle, however with compression between the lateral epicondyle and the olecranon, he has return of pain radiating into dorsal forearm.  Questionable radial nerve compression.  Will further evaluate with MRI.  He will ice 3 times a day, naproxen.  Follow-up no improvement or any worsening.  Pending MRI, will refer to physical therapy.  - MR Elbow Left w/o Contrast; Future  - naproxen (NAPROSYN) 500 MG tablet; Take 1 tablet (500 mg) by mouth 2 times daily (with meals)  Dispense: 30 tablet; Refill: 0    Return in about 3 months (around 9/18/2020) for Annual Well Check.     Jeffrey Schmidt MD, FAAFP  Family Medicine Physician  [facility] [place]  [address] [place] [address]

## 2020-06-18 NOTE — PATIENT INSTRUCTIONS
Patient Education     Radial Nerve Palsy  The radial nerve runs down the length of the arm. It is one of 3 nerves that carry signals from the brain down the arm to the hand. It controls movement of the triceps muscle at the back of the upper arm. It also controls movement and feeling in the wrist and hand. Radial nerve palsy (also called wrist drop) is caused by damage to the radial nerve. Symptoms include:    Weakness of the wrist and fingers    Muscle shrinkage in the arm or hand    Pain in the arm, wrist, or hand    Inability to straighten the wrist or fingers    Numbness or tingling of the hand  Injury to the radial nerve may be caused by:    Direct blow to the nerve    Cut or other wound that affects the nerve    Fracture of the arm or elbow     Unrelieved pressure on the radial nerve (from things such as sleeping with the arm trapped under the body)    Injury that results in swelling around the nerve    Use of crutches resulting in compression of on the nerve    Long-term constriction of the wrist (for example, from a tight watch or bracelet)    Repetitive twisting motions of the forearm  In some cases, no cause can be found.  The treatment depends on the cause of the nerve damage. In some cases, the problem will go away on its once pressure to the nerve is relieved. Your healthcare provider may do electromyography (EMG) and nerve conduction studies (NCS) to determine the extent of damage done to the radial nerve. This will allow your provider to know a rough estimate of the time needed for you to get better. Splinting the wrist to limit movement may help with healing. If the problem is due to trauma or injury, physical therapy may be prescribed. Corticosteroids injections into the area may reduce swelling and pressure on the nerve. Surgery to repair the nerve may be needed for chronic symptoms that don't respond to simpler treatments.  Home care    Rest the wrist and arm until normal feeling and strength  return.    If you were given a splint or sling, wear it as directed.    Don't lean on your elbows.    If medicines were prescribed for pain or nerve sensations, take them as directed.  Follow-up care  Follow up with your healthcare provider or as advised by our staff.  When to seek medical advice  Call your healthcare provider right away if you have any of the following:    Increasing arm swelling or pain    Worsening numbness or weakness of the arm    Symptoms spreading to other parts of the body    Slurred speech, confusion    Shortness of breath or trouble breathing    Trouble speaking, walking, or seeing  Date Last Reviewed: 4/1/2018 2000-2019 [employer]. [address] [place] [address]. All rights reserved. This information is not intended as a substitute for professional medical care. Always follow your healthcare professional's instructions.         Jose Manuel,    It was great seeing you in clinic today.  I summarized our discussion and your plan below.  Please let me know if you have any questions or concerns.  Please follow up with me as discussed in clinic or sooner if any worsening or additional concerns.     1. Radial nerve compression, left  34-year-old male with 1 week history of left elbow and forearm pain.  No trauma, no specific propagating factors.  On exam no obvious tenderness over the lateral epicondyle, however with compression between the lateral epicondyle and the olecranon, he has return of pain radiating into dorsal forearm.  Questionable radial nerve compression.  Will further evaluate with MRI.  He will ice 3 times a day, naproxen.  Follow-up no improvement or any worsening.  Pending MRI, will refer to physical therapy.  - MR Elbow Left w/o Contrast; Future  - naproxen (NAPROSYN) 500 MG tablet; Take 1 tablet (500 mg) by mouth 2 times daily (with meals)  Dispense: 30 tablet; Refill: 0       Sincerely,  Dr. ALYSHA Schmidt MD, FAAFP  Family Medicine  Physician  [facility] [place]  [address] [place] [address]    Patient Education

## 2020-06-26 ENCOUNTER — HOSPITAL ENCOUNTER (OUTPATIENT)
Dept: MRI IMAGING | Facility: CLINIC | Age: 35
Discharge: HOME | End: 2020-06-26
Attending: FAMILY MEDICINE | Admitting: FAMILY MEDICINE
Payer: COMMERCIAL

## 2020-06-26 DIAGNOSIS — G56.32 RADIAL NERVE COMPRESSION, LEFT: ICD-10-CM

## 2020-06-26 PROCEDURE — 73221 MRI JOINT UPR EXTREM W/O DYE: CPT | Mod: LT

## 2020-06-29 ENCOUNTER — TELEPHONE (OUTPATIENT)
Dept: FAMILY MEDICINE | Facility: CLINIC | Age: 35
End: 2020-06-29

## 2020-06-29 NOTE — TELEPHONE ENCOUNTER
Left message for patient to return call to clinic. When call is returned please see results below.     Jhonny Mendoza,     __    Arturo Matson MD Alderman, Shawn, MD               Please call with results.     Dr. Schmidt is out of the office. I have reviewed your MRI results. Your MRI shows no issues with the radial nerve; however, there is swelling around your hand flexor muscles that attach to your elbow. This is the likely cause of your discomfort and correlates to a muscle strain. This should heal on it's own over the next couple of weeks.     Please call the clinic with any questions you may have.     Have a great day,     Dr. Murray

## 2021-10-21 ENCOUNTER — APPOINTMENT (OUTPATIENT)
Dept: OCCUPATIONAL MEDICINE | Facility: OTHER | Age: 36
End: 2021-10-21

## 2021-10-21 PROCEDURE — 99000 SPECIMEN HANDLING OFFICE-LAB: CPT

## 2023-02-14 ENCOUNTER — APPOINTMENT (OUTPATIENT)
Dept: CHIROPRACTIC MEDICINE | Facility: OTHER | Age: 38
End: 2023-02-14

## 2023-02-14 ENCOUNTER — APPOINTMENT (OUTPATIENT)
Dept: OCCUPATIONAL MEDICINE | Facility: OTHER | Age: 38
End: 2023-02-14

## 2023-02-14 PROCEDURE — 99499 UNLISTED E&M SERVICE: CPT
